# Patient Record
Sex: FEMALE | Race: BLACK OR AFRICAN AMERICAN | NOT HISPANIC OR LATINO | Employment: FULL TIME | URBAN - METROPOLITAN AREA
[De-identification: names, ages, dates, MRNs, and addresses within clinical notes are randomized per-mention and may not be internally consistent; named-entity substitution may affect disease eponyms.]

---

## 2023-01-19 ENCOUNTER — PATIENT MESSAGE (OUTPATIENT)
Dept: RHEUMATOLOGY | Facility: CLINIC | Age: 43
End: 2023-01-19

## 2023-01-19 ENCOUNTER — TELEPHONE (OUTPATIENT)
Dept: RHEUMATOLOGY | Facility: CLINIC | Age: 43
End: 2023-01-19

## 2023-01-19 NOTE — TELEPHONE ENCOUNTER
----- Message from Diamond Moncada sent at 1/9/2023  9:33 AM CST -----  Regarding: Needs to schedule new Pt  Type: Needs Medical Advice  Who Called:  Luisa  Symptoms (please be specific):  Arthritis, autoimmune     Best Call Back Number: [phone]    Additional Information: Dr Frey reffered pt to Dr Castaneda, Please call to advise

## 2023-01-19 NOTE — TELEPHONE ENCOUNTER
----- Message from Lorne Forrester sent at 1/19/2023  8:27 AM CST -----  Who Called: patient         What is the reqeust in detail: Requesting call back to discuss pt being referred pt forgot about appt last year, pt was told to contact for new pt appt. Trying to get in for auto immune deficiency. Please advise         Can the clinic reply by [facility]? yes         Best Call Back Number: [phone].            Additional Information:

## 2023-01-19 NOTE — TELEPHONE ENCOUNTER
----- Message from Diamond Moncada sent at 1/9/2023  9:33 AM CST -----  Regarding: Needs to schedule new Pt  Type: Needs Medical Advice  Who Called:  Luisa  Symptoms (please be specific):  Arthritis, autoimmune     Best Call Back Number: [phone]    Additional Information: Dr Frey reffered pt to Dr Castaneda, Please call to advise

## 2023-02-16 ENCOUNTER — PATIENT MESSAGE (OUTPATIENT)
Dept: RHEUMATOLOGY | Facility: CLINIC | Age: 43
End: 2023-02-16